# Patient Record
Sex: MALE | Race: WHITE | Employment: FULL TIME | URBAN - METROPOLITAN AREA
[De-identification: names, ages, dates, MRNs, and addresses within clinical notes are randomized per-mention and may not be internally consistent; named-entity substitution may affect disease eponyms.]

---

## 2019-09-01 ENCOUNTER — HOSPITAL ENCOUNTER (EMERGENCY)
Age: 34
Discharge: HOME | End: 2019-09-01
Payer: COMMERCIAL

## 2019-09-01 ENCOUNTER — APPOINTMENT (OUTPATIENT)
Dept: GENERAL RADIOLOGY | Age: 34
End: 2019-09-01
Payer: COMMERCIAL

## 2019-09-01 VITALS
BODY MASS INDEX: 25.67 KG/M2 | RESPIRATION RATE: 12 BRPM | WEIGHT: 200 LBS | DIASTOLIC BLOOD PRESSURE: 88 MMHG | HEART RATE: 70 BPM | SYSTOLIC BLOOD PRESSURE: 135 MMHG | HEIGHT: 74 IN | OXYGEN SATURATION: 97 % | TEMPERATURE: 97.1 F

## 2019-09-01 DIAGNOSIS — S61.411A LACERATION OF RIGHT HAND, FOREIGN BODY PRESENCE UNSPECIFIED, INITIAL ENCOUNTER: Primary | ICD-10-CM

## 2019-09-01 LAB — RAPID HIV 1&2: NORMAL

## 2019-09-01 PROCEDURE — 6360000002 HC RX W HCPCS: Performed by: NURSE PRACTITIONER

## 2019-09-01 PROCEDURE — 86803 HEPATITIS C AB TEST: CPT

## 2019-09-01 PROCEDURE — 6370000000 HC RX 637 (ALT 250 FOR IP): Performed by: NURSE PRACTITIONER

## 2019-09-01 PROCEDURE — 90471 IMMUNIZATION ADMIN: CPT | Performed by: NURSE PRACTITIONER

## 2019-09-01 PROCEDURE — 86701 HIV-1ANTIBODY: CPT

## 2019-09-01 PROCEDURE — 4500000023 HC ED LEVEL 3 PROCEDURE

## 2019-09-01 PROCEDURE — 90715 TDAP VACCINE 7 YRS/> IM: CPT | Performed by: NURSE PRACTITIONER

## 2019-09-01 PROCEDURE — 73120 X-RAY EXAM OF HAND: CPT

## 2019-09-01 PROCEDURE — 87340 HEPATITIS B SURFACE AG IA: CPT

## 2019-09-01 PROCEDURE — 99283 EMERGENCY DEPT VISIT LOW MDM: CPT

## 2019-09-01 RX ORDER — HYDROCODONE BITARTRATE AND ACETAMINOPHEN 5; 325 MG/1; MG/1
1 TABLET ORAL ONCE
Status: COMPLETED | OUTPATIENT
Start: 2019-09-01 | End: 2019-09-01

## 2019-09-01 RX ORDER — HYDROCODONE BITARTRATE AND ACETAMINOPHEN 5; 325 MG/1; MG/1
1 TABLET ORAL EVERY 6 HOURS PRN
Qty: 8 TABLET | Refills: 0 | Status: SHIPPED | OUTPATIENT
Start: 2019-09-01 | End: 2019-09-04

## 2019-09-01 RX ADMIN — TETANUS TOXOID, REDUCED DIPHTHERIA TOXOID AND ACELLULAR PERTUSSIS VACCINE, ADSORBED 0.5 ML: 5; 2.5; 8; 8; 2.5 SUSPENSION INTRAMUSCULAR at 12:15

## 2019-09-01 RX ADMIN — HYDROCODONE BITARTRATE AND ACETAMINOPHEN 1 TABLET: 5; 325 TABLET ORAL at 15:32

## 2019-09-01 ASSESSMENT — PAIN DESCRIPTION - LOCATION: LOCATION: HAND

## 2019-09-01 ASSESSMENT — PAIN SCALES - GENERAL
PAINLEVEL_OUTOF10: 6
PAINLEVEL_OUTOF10: 9

## 2019-09-01 ASSESSMENT — PAIN DESCRIPTION - ORIENTATION: ORIENTATION: RIGHT

## 2019-09-01 ASSESSMENT — ENCOUNTER SYMPTOMS
ABDOMINAL PAIN: 0
SHORTNESS OF BREATH: 0

## 2019-09-01 ASSESSMENT — PAIN DESCRIPTION - PAIN TYPE: TYPE: ACUTE PAIN

## 2019-09-01 NOTE — ED PROVIDER NOTES
[place] around [employer], and rocks and fell on loose rock and scraped right hand on rocks 1hr ago       HISTORY OF PRESENTILLNESS   (Location/Symptom, Timing/Onset, Context/Setting, Quality, Duration, Modifying Factors, Severity)  Note limiting factors. Barry Vital is a 36 y.o. male for right hand laceration. Onset was today. Duration has been since the onset. Context includes pt states he was hiking and cut the right palm of his hand today. Alleviating factors include nothing. Aggravating factors include nothign. nothing has been used for pain today. Nursing Notes were all reviewed and agreed with or any disagreements were addressed  in the HPI. REVIEW OF SYSTEMS    (2-9 systems for level 4, 10 or more for level 5)     Review of Systems   Constitutional: Negative for fever. Respiratory: Negative for shortness of breath. Cardiovascular: Negative for chest pain. Gastrointestinal: Negative for abdominal pain. Genitourinary: Negative for difficulty urinating. Skin: Positive for wound. All other systems reviewed and are negative. Positives and Pertinent negatives as per HPI. Except as noted above in the ROS, all other systems were reviewed and negative. PAST MEDICAL HISTORY   History reviewed. No pertinent past medical history. SURGICAL HISTORY     History reviewed. No pertinent surgical history. CURRENT MEDICATIONS     There are no discharge medications for this patient. ALLERGIES     Patient has no known allergies. FAMILY HISTORY     History reviewed. No pertinent family history.        SOCIAL HISTORY       Social History     Socioeconomic History    Marital status: Single     Spouse name: None    Number of children: None    Years of education: None    Highest education level: None   Occupational History    None   Social Needs    Financial resource strain: None    Food insecurity:     Worry: None     Inability: None    Transportation needs:     Medical: None     Non-medical: None   Tobacco Use    Smoking status: Former Smoker     Packs/day: 0.50     Types: E-Cigarettes    Smokeless tobacco: Former User   Substance and Sexual Activity    Alcohol use: Yes     Comment: occ    Drug use: Not Currently    Sexual activity: None   Lifestyle    Physical activity:     Days per week: None     Minutes per session: None    Stress: None   Relationships    Social connections:     Talks on phone: None     Gets together: None     Attends Confucianism service: None     Active member of club or organization: None     Attends meetings of clubs or organizations: None     Relationship status: None    Intimate partner violence:     Fear of current or ex partner: None     Emotionally abused: None     Physically abused: None     Forced sexual activity: None   Other Topics Concern    None   Social History Narrative    None       SCREENINGS             PHYSICAL EXAM  (up to 7 for level 4, 8 or more for level 5)     ED Triage Vitals [09/01/19 1148]   BP Temp Temp Source Pulse Resp SpO2 Height Weight   135/88 97.1 °F (36.2 °C) Oral 70 12 97 % 6' 2\" (1.88 m) 200 lb (90.7 kg)       Physical Exam   Constitutional: He is oriented to person, place, and time. He appears well-developed and well-nourished. HENT:   Head: Normocephalic and atraumatic. Neck: Normal range of motion. Cardiovascular: Normal rate. Pulmonary/Chest: Effort normal. No respiratory distress. Abdominal: Soft. He exhibits no distension. Musculoskeletal: Normal range of motion. He exhibits tenderness. He exhibits no edema or deformity. Full range of motion to right thumb. 3 cm laceration to the right thenar aspect. Sensation strength and pulse intact right hand. Bleeding is controlled. Patient is right-hand dominant. Neurological: He is alert and oriented to person, place, and time. Skin: Skin is warm and dry. Psychiatric: He has a normal mood and affect.        DIAGNOSTIC RESULTS   LABS:    Labs punctate densities suggesting possible foreign bodies. PROCEDURES   Unless otherwise noted below, none         CRITICAL CARE TIME   N/A    CONSULTS:  None      EMERGENCYDEPARTMENT COURSE and DIFFERENTIAL DIAGNOSIS/MDM:   Vitals:    Vitals:    09/01/19 1148   BP: 135/88   Pulse: 70   Resp: 12   Temp: 97.1 °F (36.2 °C)   TempSrc: Oral   SpO2: 97%   Weight: 200 lb (90.7 kg)   Height: 6' 2\" (1.88 m)       Patient was given the following medications:  Medications   Tetanus-Diphth-Acell Pertussis (BOOSTRIX) injection 0.5 mL (0.5 mLs Intramuscular Given 9/1/19 1215)   HYDROcodone-acetaminophen (NORCO) 5-325 MG per tablet 1 tablet (1 tablet Oral Given 9/1/19 1532)       Patient was seen and evaluated by myself. Patient here for laceration to the right thenar aspect of his right hand. Patient is right-hand dominant. Patient reports that he was hiking on rocks ins[place] when he fell cutting his hand. He is neurologically intact to his right hand. Bleeding is controlled. Patient was provided with pain medications and tetanus here. He did have extensive irrigation foreign bodies were removed. See procedure note for laceration repair. Patient was discharged home with instructions to return to the ED in 7 to 10 days for suture removals. He was given a PCP referral encouraged to follow-up and establish care. He was also encouraged to return to the ED for worsening symptoms. Patient was discharged home with all questions answered. The patient tolerated their visit well. I have evaluated thispatient. My supervising physician was available for consultation. The patient and / or the family were informed of the results of any tests, a time was given to answer questions, a plan was proposed and they agreed [place]. FINAL IMPRESSION      1.  Laceration of right hand, foreign body presence unspecified, initial encounter          DISPOSITION/PLAN   DISPOSITION Decision To Discharge 09/01/2019 01:56:51

## 2019-09-02 LAB
HEPATITIS B SURFACE ANTIGEN INTERPRETATION: NORMAL
HEPATITIS C ANTIBODY INTERPRETATION: NORMAL

## 2019-09-02 NOTE — ED NOTES
Discharge instructions reviewed with Mr. Latrice Jimenez. He verbalized understanding. Copy of discharge instructions and prescriptions given. Mr. Latrice Jimenez was discharged to home in good condition per personal vehicle, friend/family driving. He exited the ED without difficulty.         Michelle Moses RN  09/01/19 7857
Normal range of motion. He exhibits tenderness. He exhibits no edema or deformity. Full range of motion to right thumb. There is no tenderness at the snuffbox. Sensation strength and pulse intact right hand. There is a 2-1/2 x 2 cm L-shaped laceration to the right thenar. Bleeding is controlled. Neurological: He is alert and oriented to person, place, and time. Skin: Skin is warm and dry. Psychiatric: He has a normal mood and affect. DIAGNOSTIC RESULTS   LABS:    Labs Reviewed   HIV RAPID 1&2    Narrative:     Performed at:  [facility]) [facility]  [address],  [id number], [facility]   Phone [phone]   HEPATITIS B SURFACE ANTIGEN   HEPATITIS C ANTIBODY       All other labs werewithin normal range or not returned as of this dictation. EKG: All EKG's are interpreted by the Emergency Department Physician who either signs or Co-signs this chart in the absence of acardiologist.  Please see their note for interpretation of EKG. RADIOLOGY:     Right hand x-ray interpreted by radiologist for  Impression:    No acute osseous abnormality.  Focal laceration adjacent to the 1st  metacarpal with punctate densities suggesting possible foreign bodies            Interpretation per the Radiologist below, if available at the time of this note:    XR HAND RIGHT (2 VIEWS)   Final Result   No acute osseous abnormality. Focal laceration adjacent to the 1st   metacarpal with punctate densities suggesting possible foreign bodies. Xr Hand Right (2 Views)    Result Date: 9/1/2019  EXAMINATION: 2 XRAY VIEWS OF THE RIGHT HAND 9/1/2019 12:20 pm COMPARISON: None. HISTORY: Reason for Exam: laceration to anterior aspect of patients rt hand,   [facility] area Acuity: Acute Type of Exam: Initial Mechanism of Injury: cut with a piece of slate FINDINGS: There is no evidence of acute fracture. There is normal alignment. No acute joint abnormality. No focal osseous lesion.   Focal

## 2019-09-10 ASSESSMENT — ENCOUNTER SYMPTOMS
ABDOMINAL PAIN: 0
SHORTNESS OF BREATH: 0